# Patient Record
Sex: FEMALE | Race: BLACK OR AFRICAN AMERICAN
[De-identification: names, ages, dates, MRNs, and addresses within clinical notes are randomized per-mention and may not be internally consistent; named-entity substitution may affect disease eponyms.]

---

## 2018-12-13 ENCOUNTER — HOSPITAL ENCOUNTER (EMERGENCY)
Dept: HOSPITAL 74 - JERFT | Age: 18
Discharge: HOME | End: 2018-12-13
Payer: SELF-PAY

## 2018-12-13 VITALS — HEART RATE: 97 BPM | TEMPERATURE: 98 F | DIASTOLIC BLOOD PRESSURE: 72 MMHG | SYSTOLIC BLOOD PRESSURE: 124 MMHG

## 2018-12-13 VITALS — BODY MASS INDEX: 17.4 KG/M2

## 2018-12-13 DIAGNOSIS — J44.9: ICD-10-CM

## 2018-12-13 DIAGNOSIS — M94.0: Primary | ICD-10-CM

## 2018-12-13 NOTE — PDOC
History of Present Illness





- General


Chief Complaint: Cold Symptoms


Stated Complaint: Pain


Time Seen by Provider: 12/13/18 21:00


History Source: Patient


Exam Limitations: No Limitations





- History of Present Illness


Initial Comments: 


Patient is a 18-year-old female who states over the past 2 week she has had a 

dry nonproductive cough.  She denies fever, denies chills.  Denies recent 

international travel or sick contacts.  She states that she now has pain upon 

palpation to the anterior and posterior chest; denies injury or trauma.  She 

denies hemoptysis  She describes the pain as a throb, worse with palpation 

rates it at a 3 out of 10.  Patient denies attempting over-the-counter 

analgesics and denies any relieving factors.


12/13/18 21:03








Past History





- Travel


Traveled outside of the country in the last 30 days: No


Close contact w/someone who was outside of country & ill: No





- Past Medical History


Allergies/Adverse Reactions: 


 Allergies











Allergy/AdvReac Type Severity Reaction Status Date / Time


 


No Known Allergies Allergy   Verified 03/27/15 10:48











Home Medications: 


Ambulatory Orders





NK [No Known Home Medication]  12/13/18 








Cancer: Yes


COPD: Yes


Other medical history: Scoliosis





- Suicide/Smoking/Psychosocial Hx


Smoking Status: No


Smoking History: Unknown if ever smoked


Have you smoked in the past 12 months: No


Number of Cigarettes Smoked Daily: 0


Information on smoking cessation initiated: No


Hx Alcohol Use: No


Drug/Substance Use Hx: No


Substance Use Type: None





**Review of Systems





- Review of Systems


Able to Perform ROS?: Yes


Constitutional: No: Chills, Fever


Respiratory: Yes: Cough.  No: Shortness of Breath, Wheezing, Productive cough


Cardiac (ROS): No: Chest Pain


All Other Systems: Reviewed and Negative





*Physical Exam





- Vital Signs


 Last Vital Signs











Temp Pulse Resp BP Pulse Ox


 


 98.0 F   97 H  16   124/72   100 


 


 12/13/18 20:48  12/13/18 20:48  12/13/18 20:48  12/13/18 20:48  12/13/18 20:48














- Physical Exam


Comments: 


Constitutional: VS stated, pt appears in no apparent distress; sitting in chair.


Skin: Warm and dry. Intact, no lesions or excoriations. 


Head: Normocephalic; atraumatic


Eyes:  conjunctiva pink without injection or discharge.


Ears: No tenderness present. Canals without injection or discharge; TM clear, 

no retractions or bulging. 


Nose: Patent, mucosa pink. No drainage.


Throat: Oropharynx with pink and moist mucosa. Dentition good. No pharyngeal 

edema; erythema or exudate. Tongue normal, no fasciculations. Airway Patent. 

Hypoglossal area is soft. Uvula is midline. No trismus. 


Chest: Normal AP diameter, symmetrical excursions bilaterally, no retractions 

or bulging of the intercostal spaces. Pt has pain upon palpation.


Lungs:  Bilateral breath sounds clear upon auscultation. No adventitious breath 

sounds.


Heart:  Regular rate and rhythm,  


Musculoskeletal: Moves all extremities without difficulty.


Neurologic: Awake, alert. Conversation fluent.


Psych: Appropriate affect.


12/13/18 21:04








Moderate Sedation





- Procedure Monitoring


Vital Signs: 


Procedure Monitoring Vital Signs











Temperature  98.0 F   12/13/18 20:48


 


Pulse Rate  97 H  12/13/18 20:48


 


Respiratory Rate  16   12/13/18 20:48


 


Blood Pressure  124/72   12/13/18 20:48


 


O2 Sat by Pulse Oximetry (%)  100   12/13/18 20:48











*DC/Admit/Observation/Transfer


Diagnosis at time of Disposition: 


 Costochondritis








- Discharge Dispostion


Disposition: HOME


Condition at time of disposition: Stable


Decision to Admit order: No





- Referrals


Referrals: 


Justine Blankenship [Primary Care Provider] - 





- Patient Instructions


Printed Discharge Instructions:  DI for Costochondritis


Additional Instructions: 


Ibuprofen 600 mg every 6 hours. F/U with your PCP.





- Post Discharge Activity

## 2021-02-03 ENCOUNTER — HOSPITAL ENCOUNTER (EMERGENCY)
Dept: HOSPITAL 74 - JER | Age: 21
Discharge: HOME | End: 2021-02-03
Payer: COMMERCIAL

## 2021-02-03 VITALS — BODY MASS INDEX: 20.7 KG/M2

## 2021-02-03 VITALS — SYSTOLIC BLOOD PRESSURE: 122 MMHG | DIASTOLIC BLOOD PRESSURE: 66 MMHG | TEMPERATURE: 97.9 F | HEART RATE: 90 BPM

## 2021-02-03 DIAGNOSIS — Z3A.09: ICD-10-CM

## 2021-02-03 DIAGNOSIS — O21.9: Primary | ICD-10-CM

## 2021-02-03 LAB
ANION GAP SERPL CALC-SCNC: 6 MMOL/L (ref 8–16)
APPEARANCE UR: CLEAR
APTT BLD: 29.2 SECONDS (ref 25.2–36.5)
BASOPHILS # BLD: 0.2 % (ref 0–2)
BILIRUB UR STRIP.AUTO-MCNC: NEGATIVE MG/DL
BUN SERPL-MCNC: 6.2 MG/DL (ref 7–18)
CALCIUM SERPL-MCNC: 9 MG/DL (ref 8.5–10.1)
CHLORIDE SERPL-SCNC: 104 MMOL/L (ref 98–107)
CO2 SERPL-SCNC: 26 MMOL/L (ref 21–32)
COLOR UR: YELLOW
CREAT SERPL-MCNC: 0.5 MG/DL (ref 0.55–1.3)
DEPRECATED RDW RBC AUTO: 15.9 % (ref 11.6–15.6)
EOSINOPHIL # BLD: 1.1 % (ref 0–4.5)
GLUCOSE SERPL-MCNC: 71 MG/DL (ref 74–106)
HCT VFR BLD CALC: 31.6 % (ref 32.4–45.2)
HGB BLD-MCNC: 10.3 GM/DL (ref 10.7–15.3)
INR BLD: 1.09 (ref 0.83–1.09)
KETONES UR QL STRIP: NEGATIVE
LEUKOCYTE ESTERASE UR QL STRIP.AUTO: NEGATIVE
LYMPHOCYTES # BLD: 20 % (ref 8–40)
MCH RBC QN AUTO: 24.9 PG (ref 25.7–33.7)
MCHC RBC AUTO-ENTMCNC: 32.7 G/DL (ref 32–36)
MCV RBC: 76.3 FL (ref 80–96)
MONOCYTES # BLD AUTO: 7.2 % (ref 3.8–10.2)
NEUTROPHILS # BLD: 71.5 % (ref 42.8–82.8)
NITRITE UR QL STRIP: NEGATIVE
PH UR: 6.5 [PH] (ref 5–8)
PLATELET # BLD AUTO: 231 K/MM3 (ref 134–434)
PMV BLD: 8.5 FL (ref 7.5–11.1)
POTASSIUM SERPLBLD-SCNC: 3.8 MMOL/L (ref 3.5–5.1)
PROT UR QL STRIP: NEGATIVE
PROT UR QL STRIP: NEGATIVE
PT PNL PPP: 13.4 SEC (ref 9.7–13)
RBC # BLD AUTO: 4.14 M/MM3 (ref 3.6–5.2)
SODIUM SERPL-SCNC: 136 MMOL/L (ref 136–145)
SP GR UR: 1.01 (ref 1.01–1.03)
UROBILINOGEN UR STRIP-MCNC: 0.2 MG/DL (ref 0.2–1)
WBC # BLD AUTO: 7.9 K/MM3 (ref 4–10)

## 2021-02-03 PROCEDURE — 3E033GC INTRODUCTION OF OTHER THERAPEUTIC SUBSTANCE INTO PERIPHERAL VEIN, PERCUTANEOUS APPROACH: ICD-10-PCS
